# Patient Record
Sex: FEMALE | Race: WHITE | URBAN - METROPOLITAN AREA
[De-identification: names, ages, dates, MRNs, and addresses within clinical notes are randomized per-mention and may not be internally consistent; named-entity substitution may affect disease eponyms.]

---

## 2019-01-03 ENCOUNTER — FOLLOW UP ESTABLISHED (OUTPATIENT)
Dept: URBAN - METROPOLITAN AREA CLINIC 56 | Facility: CLINIC | Age: 39
End: 2019-01-03
Payer: COMMERCIAL

## 2019-01-03 ENCOUNTER — APPOINTMENT (RX ONLY)
Dept: URBAN - METROPOLITAN AREA CLINIC 163 | Facility: CLINIC | Age: 39
Setting detail: DERMATOLOGY
End: 2019-01-03

## 2019-01-03 DIAGNOSIS — L51.1 STEVENS-JOHNSON SYNDROME: ICD-10-CM

## 2019-01-03 PROCEDURE — 92285 EXTERNAL OCULAR PHOTOGRAPHY: CPT | Performed by: OPHTHALMOLOGY

## 2019-01-03 PROCEDURE — ? PATIENT SPECIFIC COUNSELING

## 2019-01-03 PROCEDURE — ? COUNSELING

## 2019-01-03 PROCEDURE — 99202 OFFICE O/P NEW SF 15 MIN: CPT

## 2019-01-03 PROCEDURE — 99203 OFFICE O/P NEW LOW 30 MIN: CPT | Performed by: OPHTHALMOLOGY

## 2019-01-03 PROCEDURE — ? PRESCRIPTION

## 2019-01-03 PROCEDURE — ? EDUCATIONAL RESOURCES PROVIDED

## 2019-01-03 RX ORDER — TRIAMCINOLONE ACETONIDE 1 MG/G
1 PASTE TOPICAL TWICE DAILY
Qty: 1 | Refills: 2 | Status: ERX | COMMUNITY
Start: 2019-01-03

## 2019-01-03 RX ORDER — TRIAMCINOLONE ACETONIDE 1 MG/G
APPLY PASTE TOPICAL TWICE DAILY
Qty: 1 | Refills: 2 | Status: ERX

## 2019-01-03 RX ORDER — PREDNISOLONE ACETATE 10 MG/ML
1 % SUSPENSION/ DROPS OPHTHALMIC
Qty: 1 | Refills: 1 | Status: INACTIVE
Start: 2019-01-03 | End: 2019-02-15

## 2019-01-03 RX ADMIN — TRIAMCINOLONE ACETONIDE 1: 1 PASTE TOPICAL at 00:00

## 2019-01-03 ASSESSMENT — LOCATION SIMPLE DESCRIPTION DERM
LOCATION SIMPLE: VULVA
LOCATION SIMPLE: LEFT INFERIOR LIP
LOCATION SIMPLE: RIGHT INFERIOR MUCOSAL LIP

## 2019-01-03 ASSESSMENT — LOCATION DETAILED DESCRIPTION DERM
LOCATION DETAILED: LEFT LABIA MAJORA
LOCATION DETAILED: LEFT INFERIOR VERMILION LIP
LOCATION DETAILED: RIGHT LABIA MAJORA
LOCATION DETAILED: RIGHT INFERIOR MUCOSAL LIP

## 2019-01-03 ASSESSMENT — SEVERITY ASSESSMENT: SEVERITY: MILD

## 2019-01-03 ASSESSMENT — LOCATION ZONE DERM
LOCATION ZONE: LIP
LOCATION ZONE: VULVA

## 2019-01-03 NOTE — PROCEDURE: PATIENT SPECIFIC COUNSELING
Patient will use warm water swish with small amount of salt and tumeric 2-3 times daily as tolerated.  \\n\\nShe will use triamcinolone dental paste for mucosal lip and inside the mouth until healed.  Vaniply ointment to use throughout the day on the lip.  \\n\\nVaginal area appears almost healed.
Detail Level: Zone
Patient should update medical record to reflect severe allergy to Amoxicillin and Flu shot.  Patient should also avoid cephalosporins due to possible cross reaction.

## 2019-01-03 NOTE — HPI: RASH
How Severe Is Your Rash?: moderate
Is This A New Presentation, Or A Follow-Up?: Rash
Additional History: Patient started having flu like symptoms 2 weeks ago and started taking amoxicillin thinking it would help her symptoms.

## 2019-01-11 ENCOUNTER — FOLLOW UP ESTABLISHED (OUTPATIENT)
Dept: URBAN - METROPOLITAN AREA CLINIC 24 | Facility: CLINIC | Age: 39
End: 2019-01-11
Payer: COMMERCIAL

## 2019-01-11 PROCEDURE — 92012 INTRM OPH EXAM EST PATIENT: CPT | Performed by: OPHTHALMOLOGY

## 2019-01-11 ASSESSMENT — INTRAOCULAR PRESSURE
OD: 13
OS: 12

## 2019-01-25 ENCOUNTER — FOLLOW UP ESTABLISHED (OUTPATIENT)
Dept: URBAN - METROPOLITAN AREA CLINIC 10 | Facility: CLINIC | Age: 39
End: 2019-01-25
Payer: COMMERCIAL

## 2019-01-25 PROCEDURE — 92012 INTRM OPH EXAM EST PATIENT: CPT | Performed by: OPHTHALMOLOGY

## 2019-01-25 PROCEDURE — 65778 COVER EYE W/MEMBRANE: CPT | Performed by: OPHTHALMOLOGY

## 2019-01-25 RX ORDER — OFLOXACIN 3 MG/ML
0.3 % SOLUTION/ DROPS OPHTHALMIC
Qty: 1 | Refills: 0 | Status: INACTIVE
Start: 2019-01-25 | End: 2019-05-08

## 2019-01-31 ENCOUNTER — FOLLOW UP ESTABLISHED (OUTPATIENT)
Dept: URBAN - METROPOLITAN AREA CLINIC 24 | Facility: CLINIC | Age: 39
End: 2019-01-31
Payer: COMMERCIAL

## 2019-01-31 PROCEDURE — 99024 POSTOP FOLLOW-UP VISIT: CPT | Performed by: OPHTHALMOLOGY

## 2019-02-08 ENCOUNTER — FOLLOW UP ESTABLISHED (OUTPATIENT)
Dept: URBAN - METROPOLITAN AREA CLINIC 24 | Facility: CLINIC | Age: 39
End: 2019-02-08
Payer: COMMERCIAL

## 2019-02-08 PROCEDURE — 92012 INTRM OPH EXAM EST PATIENT: CPT | Performed by: OPHTHALMOLOGY

## 2019-02-08 PROCEDURE — 65778 COVER EYE W/MEMBRANE: CPT | Performed by: OPHTHALMOLOGY

## 2019-02-08 RX ORDER — CYCLOSPORINE 0.5 MG/ML
0.05 % EMULSION OPHTHALMIC
Qty: 180 | Refills: 3 | Status: INACTIVE
Start: 2019-02-08 | End: 2019-02-12

## 2019-02-12 ENCOUNTER — FOLLOW UP ESTABLISHED (OUTPATIENT)
Dept: URBAN - METROPOLITAN AREA CLINIC 30 | Facility: CLINIC | Age: 39
End: 2019-02-12
Payer: COMMERCIAL

## 2019-02-12 PROCEDURE — 99213 OFFICE O/P EST LOW 20 MIN: CPT | Performed by: OPTOMETRIST

## 2019-02-15 ENCOUNTER — FOLLOW UP ESTABLISHED (OUTPATIENT)
Dept: URBAN - METROPOLITAN AREA CLINIC 24 | Facility: CLINIC | Age: 39
End: 2019-02-15
Payer: COMMERCIAL

## 2019-02-15 PROCEDURE — 92012 INTRM OPH EXAM EST PATIENT: CPT | Performed by: OPTOMETRIST

## 2019-02-15 RX ORDER — PREDNISOLONE ACETATE 10 MG/ML
1 % SUSPENSION/ DROPS OPHTHALMIC
Qty: 1 | Refills: 1 | Status: INACTIVE
Start: 2019-02-15 | End: 2019-05-08

## 2019-02-27 ENCOUNTER — APPOINTMENT (RX ONLY)
Dept: URBAN - METROPOLITAN AREA CLINIC 172 | Facility: CLINIC | Age: 39
Setting detail: DERMATOLOGY
End: 2019-02-27

## 2019-02-27 DIAGNOSIS — L51.1 STEVENS-JOHNSON SYNDROME: ICD-10-CM | Status: RESOLVED

## 2019-02-27 DIAGNOSIS — M35 OTHER SYSTEMIC INVOLVEMENT OF CONNECTIVE TISSUE: ICD-10-CM

## 2019-02-27 PROBLEM — M35.01 SJÖGREN SYNDROME WITH KERATOCONJUNCTIVITIS: Status: ACTIVE | Noted: 2019-02-27

## 2019-02-27 PROCEDURE — ? PATIENT SPECIFIC COUNSELING

## 2019-02-27 PROCEDURE — 99212 OFFICE O/P EST SF 10 MIN: CPT

## 2019-02-27 PROCEDURE — ? REFERRAL

## 2019-02-27 ASSESSMENT — LOCATION DETAILED DESCRIPTION DERM
LOCATION DETAILED: RIGHT INFERIOR LID MARGIN
LOCATION DETAILED: LEFT LATERAL SCLERA
LOCATION DETAILED: RIGHT INFERIOR MEDIAL MALAR CHEEK
LOCATION DETAILED: RIGHT LATERAL SCLERA

## 2019-02-27 ASSESSMENT — LOCATION ZONE DERM
LOCATION ZONE: CONJUNCTIVA
LOCATION ZONE: FACE
LOCATION ZONE: EYELID

## 2019-02-27 ASSESSMENT — LOCATION SIMPLE DESCRIPTION DERM
LOCATION SIMPLE: LEFT EYE
LOCATION SIMPLE: RIGHT CHEEK
LOCATION SIMPLE: RIGHT EYE
LOCATION SIMPLE: RIGHT INFERIOR EYELID

## 2019-02-27 NOTE — PROCEDURE: PATIENT SPECIFIC COUNSELING
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 She is followed by retina/cornea specialist. Patient believes augmentin VS flu vaccine could have triggered this. Patient to keep her upcoming visit with cornea specialist tomorrow. Patient was given Dr. Winter cell number to provide to her corneal specialist.
Detail Level: Zone
The patient has a history of Sjogren's syndrome but has not had rheumatology followup in years. The patient was advised that she requires rheumatology followup. She was instructed to contact Dr. Sushil Jackson and schedule an appointment.

## 2019-03-08 ENCOUNTER — FOLLOW UP ESTABLISHED (OUTPATIENT)
Dept: URBAN - METROPOLITAN AREA CLINIC 24 | Facility: CLINIC | Age: 39
End: 2019-03-08
Payer: COMMERCIAL

## 2019-03-08 PROCEDURE — 68761 CLOSE TEAR DUCT OPENING: CPT | Performed by: OPHTHALMOLOGY

## 2019-03-08 PROCEDURE — 92012 INTRM OPH EXAM EST PATIENT: CPT | Performed by: OPHTHALMOLOGY

## 2019-04-10 ENCOUNTER — FOLLOW UP ESTABLISHED (OUTPATIENT)
Dept: URBAN - METROPOLITAN AREA CLINIC 30 | Facility: CLINIC | Age: 39
End: 2019-04-10
Payer: COMMERCIAL

## 2019-04-10 DIAGNOSIS — H02.052 TRICHIASIS WITHOUT ENTROPION, RIGHT LOWER LID: ICD-10-CM

## 2019-04-10 DIAGNOSIS — H11.233 SYMBLEPHARON, BILATERAL: ICD-10-CM

## 2019-04-10 PROCEDURE — 92012 INTRM OPH EXAM EST PATIENT: CPT | Performed by: OPHTHALMOLOGY

## 2019-04-10 PROCEDURE — 67820 REVISE EYELASHES: CPT | Performed by: OPHTHALMOLOGY

## 2019-04-10 PROCEDURE — 68761 CLOSE TEAR DUCT OPENING: CPT | Performed by: OPHTHALMOLOGY

## 2019-04-10 ASSESSMENT — INTRAOCULAR PRESSURE
OD: 14
OS: 14

## 2019-04-18 ENCOUNTER — FOLLOW UP ESTABLISHED (OUTPATIENT)
Dept: URBAN - METROPOLITAN AREA CLINIC 44 | Facility: CLINIC | Age: 39
End: 2019-04-18
Payer: COMMERCIAL

## 2019-04-18 DIAGNOSIS — H02.054 TRICHIASIS WITHOUT ENTROPION, LEFT UPPER LID: ICD-10-CM

## 2019-04-18 DIAGNOSIS — H02.055 TRICHIASIS WITHOUT ENTROPION, LEFT LOWER LID: ICD-10-CM

## 2019-04-18 PROCEDURE — 92285 EXTERNAL OCULAR PHOTOGRAPHY: CPT | Performed by: OPHTHALMOLOGY

## 2019-04-18 PROCEDURE — 99213 OFFICE O/P EST LOW 20 MIN: CPT | Performed by: OPHTHALMOLOGY

## 2019-05-08 ENCOUNTER — FOLLOW UP ESTABLISHED (OUTPATIENT)
Dept: URBAN - METROPOLITAN AREA CLINIC 30 | Facility: CLINIC | Age: 39
End: 2019-05-08
Payer: COMMERCIAL

## 2019-05-08 DIAGNOSIS — L51.1 STEVENS-JOHNSON SYNDROME: ICD-10-CM

## 2019-05-08 PROCEDURE — 92012 INTRM OPH EXAM EST PATIENT: CPT | Performed by: OPHTHALMOLOGY

## 2019-05-08 RX ORDER — PREDNISOLONE ACETATE 10 MG/ML
1 % SUSPENSION/ DROPS OPHTHALMIC
Qty: 1 | Refills: 1 | Status: INACTIVE
Start: 2019-05-08 | End: 2019-06-19

## 2019-05-08 RX ORDER — CYCLOSPORINE 0.5 MG/ML
0.05 % EMULSION OPHTHALMIC
Qty: 180 | Refills: 3 | Status: INACTIVE
Start: 2019-05-08 | End: 2020-05-01

## 2019-05-08 ASSESSMENT — INTRAOCULAR PRESSURE
OS: 18
OD: 18

## 2019-06-19 ENCOUNTER — FOLLOW UP ESTABLISHED (OUTPATIENT)
Dept: URBAN - METROPOLITAN AREA CLINIC 30 | Facility: CLINIC | Age: 39
End: 2019-06-19
Payer: COMMERCIAL

## 2019-06-19 PROCEDURE — 92012 INTRM OPH EXAM EST PATIENT: CPT | Performed by: OPHTHALMOLOGY

## 2019-06-19 RX ORDER — LOTEPREDNOL ETABONATE 5 MG/ML
0.5 % SUSPENSION/ DROPS OPHTHALMIC
Qty: 1 | Refills: 1 | Status: INACTIVE
Start: 2019-06-19 | End: 2019-08-20

## 2019-06-19 RX ORDER — LOTEPREDNOL ETABONATE 5 MG/ML
0.5 % SUSPENSION/ DROPS OPHTHALMIC
Qty: 0 | Refills: 0 | Status: INACTIVE
Start: 2019-06-19 | End: 2019-06-19

## 2019-06-19 ASSESSMENT — INTRAOCULAR PRESSURE
OS: 11
OD: 11

## 2019-07-23 ENCOUNTER — FOLLOW UP ESTABLISHED (OUTPATIENT)
Dept: URBAN - METROPOLITAN AREA CLINIC 30 | Facility: CLINIC | Age: 39
End: 2019-07-23
Payer: COMMERCIAL

## 2019-07-23 DIAGNOSIS — H01.112 ALLERGIC DERMATITIS OF RIGHT LOWER LID: Primary | ICD-10-CM

## 2019-07-23 PROCEDURE — 68761 CLOSE TEAR DUCT OPENING: CPT | Performed by: OPTOMETRIST

## 2019-07-31 ENCOUNTER — FOLLOW UP ESTABLISHED (OUTPATIENT)
Dept: URBAN - METROPOLITAN AREA CLINIC 30 | Facility: CLINIC | Age: 39
End: 2019-07-31
Payer: COMMERCIAL

## 2019-07-31 DIAGNOSIS — H01.115 ALLERGIC DERMATITIS OF LEFT LOWER EYELID: ICD-10-CM

## 2019-07-31 PROCEDURE — 92012 INTRM OPH EXAM EST PATIENT: CPT | Performed by: OPHTHALMOLOGY

## 2019-07-31 RX ORDER — METHYLPREDNISOLONE 4 MG/1
4 MG TABLET ORAL TAKE AS DIRECTED
Qty: 1 | Refills: 0 | Status: INACTIVE
Start: 2019-07-31 | End: 2019-08-20

## 2019-07-31 ASSESSMENT — INTRAOCULAR PRESSURE
OS: 11
OD: 13

## 2019-08-20 ENCOUNTER — FOLLOW UP ESTABLISHED (OUTPATIENT)
Dept: URBAN - METROPOLITAN AREA CLINIC 30 | Facility: CLINIC | Age: 39
End: 2019-08-20
Payer: COMMERCIAL

## 2019-08-20 PROCEDURE — 68810 PROBE NASOLACRIMAL DUCT: CPT | Performed by: OPTOMETRIST

## 2019-08-20 PROCEDURE — 99213 OFFICE O/P EST LOW 20 MIN: CPT | Performed by: OPTOMETRIST

## 2019-08-20 PROCEDURE — 68761 CLOSE TEAR DUCT OPENING: CPT | Performed by: OPTOMETRIST

## 2019-08-29 ENCOUNTER — FOLLOW UP ESTABLISHED (OUTPATIENT)
Dept: URBAN - METROPOLITAN AREA CLINIC 44 | Facility: CLINIC | Age: 39
End: 2019-08-29
Payer: COMMERCIAL

## 2019-08-29 PROCEDURE — 92285 EXTERNAL OCULAR PHOTOGRAPHY: CPT | Performed by: OPHTHALMOLOGY

## 2019-08-29 PROCEDURE — 99213 OFFICE O/P EST LOW 20 MIN: CPT | Performed by: OPHTHALMOLOGY

## 2019-10-01 ENCOUNTER — FOLLOW UP ESTABLISHED (OUTPATIENT)
Dept: URBAN - METROPOLITAN AREA CLINIC 30 | Facility: CLINIC | Age: 39
End: 2019-10-01
Payer: COMMERCIAL

## 2019-10-01 PROCEDURE — 83861 MICROFLUID ANALY TEARS: CPT | Performed by: OPTOMETRIST

## 2019-10-01 PROCEDURE — 68761 CLOSE TEAR DUCT OPENING: CPT | Performed by: OPTOMETRIST

## 2019-10-01 ASSESSMENT — INTRAOCULAR PRESSURE
OD: 13
OS: 12

## 2019-10-14 ENCOUNTER — FOLLOW UP ESTABLISHED (OUTPATIENT)
Dept: URBAN - METROPOLITAN AREA CLINIC 30 | Facility: CLINIC | Age: 39
End: 2019-10-14
Payer: COMMERCIAL

## 2019-10-14 DIAGNOSIS — H10.812 PINGUECULITIS, LEFT EYE: ICD-10-CM

## 2019-10-14 PROCEDURE — 92012 INTRM OPH EXAM EST PATIENT: CPT | Performed by: OPTOMETRIST

## 2019-10-14 RX ORDER — FLUOROMETHOLONE 2.5 MG/ML
0.25 % SUSPENSION/ DROPS OPHTHALMIC
Qty: 1 | Refills: 0 | Status: INACTIVE
Start: 2019-10-14 | End: 2019-11-12

## 2019-11-12 ENCOUNTER — FOLLOW UP ESTABLISHED (OUTPATIENT)
Dept: URBAN - METROPOLITAN AREA CLINIC 30 | Facility: CLINIC | Age: 39
End: 2019-11-12
Payer: COMMERCIAL

## 2019-11-12 PROCEDURE — 68761 CLOSE TEAR DUCT OPENING: CPT | Performed by: OPTOMETRIST

## 2019-11-12 ASSESSMENT — INTRAOCULAR PRESSURE
OS: 11
OD: 13

## 2020-01-29 ENCOUNTER — FOLLOW UP ESTABLISHED (OUTPATIENT)
Dept: URBAN - METROPOLITAN AREA CLINIC 30 | Facility: CLINIC | Age: 40
End: 2020-01-29
Payer: COMMERCIAL

## 2020-01-29 PROCEDURE — 92012 INTRM OPH EXAM EST PATIENT: CPT | Performed by: OPHTHALMOLOGY

## 2020-05-01 ENCOUNTER — FOLLOW UP ESTABLISHED (OUTPATIENT)
Dept: URBAN - METROPOLITAN AREA CLINIC 30 | Facility: CLINIC | Age: 40
End: 2020-05-01
Payer: COMMERCIAL

## 2020-05-01 PROCEDURE — 99213 OFFICE O/P EST LOW 20 MIN: CPT | Performed by: OPTOMETRIST

## 2020-05-01 RX ORDER — PREDNISOLONE ACETATE 10 MG/ML
1 % SUSPENSION/ DROPS OPHTHALMIC
Qty: 1 | Refills: 1 | Status: INACTIVE
Start: 2020-05-01 | End: 2021-08-06

## 2020-11-05 ENCOUNTER — FOLLOW UP ESTABLISHED (OUTPATIENT)
Dept: URBAN - METROPOLITAN AREA CLINIC 30 | Facility: CLINIC | Age: 40
End: 2020-11-05
Payer: COMMERCIAL

## 2020-11-05 PROCEDURE — 68761 CLOSE TEAR DUCT OPENING: CPT | Performed by: OPTOMETRIST

## 2020-11-05 ASSESSMENT — INTRAOCULAR PRESSURE
OS: 11
OD: 11

## 2020-11-13 ENCOUNTER — FOLLOW UP ESTABLISHED (OUTPATIENT)
Dept: URBAN - METROPOLITAN AREA CLINIC 24 | Facility: CLINIC | Age: 40
End: 2020-11-13
Payer: COMMERCIAL

## 2020-11-13 PROCEDURE — 92012 INTRM OPH EXAM EST PATIENT: CPT | Performed by: OPHTHALMOLOGY

## 2020-11-13 RX ORDER — CYCLOSPORINE 0.5 MG/ML
0.05 % EMULSION OPHTHALMIC
Qty: 180 | Refills: 3 | Status: INACTIVE
Start: 2020-11-13 | End: 2021-08-06

## 2020-11-13 ASSESSMENT — INTRAOCULAR PRESSURE
OS: 8
OD: 9

## 2021-08-06 ENCOUNTER — OFFICE VISIT (OUTPATIENT)
Dept: URBAN - METROPOLITAN AREA CLINIC 30 | Facility: CLINIC | Age: 41
End: 2021-08-06
Payer: COMMERCIAL

## 2021-08-06 PROCEDURE — 68761 CLOSE TEAR DUCT OPENING: CPT | Performed by: OPTOMETRIST

## 2021-08-06 RX ORDER — CYCLOSPORINE 0.5 MG/ML
0.05 % EMULSION OPHTHALMIC
Qty: 180 | Refills: 3 | Status: INACTIVE
Start: 2021-08-06 | End: 2022-02-11

## 2021-08-06 ASSESSMENT — INTRAOCULAR PRESSURE
OS: 15
OD: 16

## 2021-08-06 NOTE — IMPRESSION/PLAN
Impression: Sicca syndrome with keratoconjunctivitis OU, severe - h/o Sjogren's dz and SJS (dx 12/2018) - No improvement with frequent lubrication and Xiidra. 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 Not using Restasis BID OU- will resume Cont PFAT's every hour. Not using sleep mask. 10/2019 0.5 mm ultraplug RUL replaced with Large Odyssey , plugs remain in place RLL, LLL, DESIREE
11/2020: Plugs are in place BUL. Plug missing today LLL. Replaced in office today LLL c 0.6mm Ultra plug. 
8/2021; RLL plug missing today--replaced

## 2022-01-28 ENCOUNTER — OFFICE VISIT (OUTPATIENT)
Dept: URBAN - METROPOLITAN AREA CLINIC 17 | Facility: CLINIC | Age: 42
End: 2022-01-28
Payer: COMMERCIAL

## 2022-01-28 DIAGNOSIS — H52.223 REGULAR ASTIGMATISM, BILATERAL: Primary | ICD-10-CM

## 2022-01-28 PROCEDURE — 92002 INTRM OPH EXAM NEW PATIENT: CPT | Performed by: OPTOMETRIST

## 2022-01-28 ASSESSMENT — INTRAOCULAR PRESSURE
OS: 11
OD: 11

## 2022-01-28 ASSESSMENT — VISUAL ACUITY
OD: 20/25
OS: 20/20

## 2022-02-11 ENCOUNTER — OFFICE VISIT (OUTPATIENT)
Dept: URBAN - METROPOLITAN AREA CLINIC 30 | Facility: CLINIC | Age: 42
End: 2022-02-11
Payer: COMMERCIAL

## 2022-02-11 DIAGNOSIS — H25.013 CORTICAL AGE-RELATED CATARACT, BILATERAL: ICD-10-CM

## 2022-02-11 DIAGNOSIS — H43.393 OTHER VITREOUS OPACITIES, BILATERAL: ICD-10-CM

## 2022-02-11 DIAGNOSIS — M35.01 SICCA SYNDROME WITH KERATOCONJUNCTIVITIS: Primary | ICD-10-CM

## 2022-02-11 PROCEDURE — 92134 CPTRZ OPH DX IMG PST SGM RTA: CPT | Performed by: OPTOMETRIST

## 2022-02-11 PROCEDURE — 99214 OFFICE O/P EST MOD 30 MIN: CPT | Performed by: OPTOMETRIST

## 2022-02-11 RX ORDER — CYCLOSPORINE 0.5 MG/ML
0.05 % EMULSION OPHTHALMIC
Qty: 180 | Refills: 3 | Status: INACTIVE
Start: 2022-02-11 | End: 2022-03-09

## 2022-02-11 RX ORDER — PREDNISOLONE ACETATE 10 MG/ML
1 % SUSPENSION/ DROPS OPHTHALMIC
Qty: 5 | Refills: 0 | Status: ACTIVE
Start: 2022-02-11

## 2022-02-11 ASSESSMENT — KERATOMETRY
OD: 42.41
OS: 41.41

## 2022-02-11 ASSESSMENT — VISUAL ACUITY
OS: 20/30
OD: 20/30

## 2022-02-11 ASSESSMENT — INTRAOCULAR PRESSURE
OS: 11
OD: 12

## 2022-02-11 NOTE — IMPRESSION/PLAN
Impression: Sicca syndrome with keratoconjunctivitis OU, severe - h/o Sjogren's dz and SJS (dx 12/2018) - No improvement with frequent lubrication and Xiidra. - temporary improvement with ambiodisc Plan: Symptoms persist OU. FBS  likley due to symblepharon/lid edema. 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 Never received Restasis BID OU- will resend. Restart PA 1% BID OU-pt ed on SEs. BLL and BUL in place today. Discussed BCL's on a regular basis (daily disposable). Discussed R/B/A. Cont PFAT's every hour. Not using sleep mask. 10/2019 0.5 mm ultraplug RUL replaced with Large Odyssey 11/2020: Plugs are in place BUL. LLL c 0.6mm Ultra plug.

## 2022-07-06 ENCOUNTER — OFFICE VISIT (OUTPATIENT)
Dept: URBAN - METROPOLITAN AREA CLINIC 30 | Facility: CLINIC | Age: 42
End: 2022-07-06
Payer: COMMERCIAL

## 2022-07-06 DIAGNOSIS — M35.01 SICCA SYNDROME WITH KERATOCONJUNCTIVITIS: Primary | ICD-10-CM

## 2022-07-06 PROCEDURE — 99213 OFFICE O/P EST LOW 20 MIN: CPT | Performed by: STUDENT IN AN ORGANIZED HEALTH CARE EDUCATION/TRAINING PROGRAM

## 2022-07-06 ASSESSMENT — INTRAOCULAR PRESSURE
OD: 13
OS: 14

## 2022-07-06 NOTE — IMPRESSION/PLAN
Impression: Sicca syndrome with keratoconjunctivitis OU, severe - h/o Sjogren's dz and SJS (dx 12/2018) - No improvement with frequent lubrication and Xiidra. - temporary improvement with ambiodisc Plan: Symptoms persist OU. FBS  likely due to symblepharon/lid edema. 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 Cont Restasis BID OU & PA 1% BID OU New punctal plug place RLL Cont PFAT's every hour. Not using sleep mask. 10/2019 0.5 mm ultraplug RUL replaced with Large Odyssey 11/2020: Plugs are in place BUL. LLL c 0.6mm Ultra plug. 7/2022: 0.6mm plug replaced RLL; LOT [id number] RTC n/a for f/u in dry eye clinic

## 2022-08-11 ENCOUNTER — OFFICE VISIT (OUTPATIENT)
Dept: URBAN - METROPOLITAN AREA CLINIC 30 | Facility: CLINIC | Age: 42
End: 2022-08-11
Payer: COMMERCIAL

## 2022-08-11 DIAGNOSIS — H53.8 OTHER VISUAL DISTURBANCES: Primary | ICD-10-CM

## 2022-08-11 DIAGNOSIS — M35.01 SICCA SYNDROME WITH KERATOCONJUNCTIVITIS: ICD-10-CM

## 2022-08-11 PROCEDURE — 92083 EXTENDED VISUAL FIELD XM: CPT | Performed by: OPTOMETRIST

## 2022-08-11 PROCEDURE — 99213 OFFICE O/P EST LOW 20 MIN: CPT | Performed by: OPTOMETRIST

## 2022-08-11 ASSESSMENT — INTRAOCULAR PRESSURE
OS: 14
OD: 13

## 2022-08-11 NOTE — IMPRESSION/PLAN
Impression: Sicca syndrome with keratoconjunctivitis OU, severe - h/o Sjogren's dz and SJS (dx 12/2018) - No improvement with frequent lubrication and Xiidra. - temporary improvement with ambiodisc Plan: Symptoms persist OS>OD. Again, OS blurry. Pt still notes FBS-- likely due to symblepharon/lid edema. 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 Continue Restasis BID OU & PA 1% BID OU Cont PFAT's every hour. Not using sleep mask. 10/2019  RUL  Large Odyssey 11/2020: Plugs are in place BUL. LLL c 0.6mm Ultra plug. 7/2022: 0.6mm plug replaced RLL- missing today.

## 2022-08-11 NOTE — IMPRESSION/PLAN
Impression: Other visual disturbances: H53.8. Plan: Acute, decreased VA OS since the end of July. Questionable etiology. Possibly mild metamorphopsia to AG. Mac OCT WNL OU. ONH appear WNL OU. BG was normal on most recent lab-work. Other blood work thus far unrevealing. Recommend  MRI of the brain and orbits, w + w/o contrast to r/o other potential etiologies. Repeat   and RNFL at next visit- paracentral defects OU. AMANDA -APD. Need to also r/o non organic LOV if MRI is WNL OU.  

08/2022  OS) low reliability, high FN OU) sup paracentral defects 08/2022 RNFL OU) WNL, low relibaility 08/2022 GCL OD) 42 OS) 77. low reliability. 08/2022  test OU: 10/10.   WNL

## 2022-12-30 ENCOUNTER — OFFICE VISIT (OUTPATIENT)
Dept: URBAN - METROPOLITAN AREA CLINIC 30 | Facility: CLINIC | Age: 42
End: 2022-12-30
Payer: COMMERCIAL

## 2022-12-30 DIAGNOSIS — M35.01 SICCA SYNDROME WITH KERATOCONJUNCTIVITIS: Primary | ICD-10-CM

## 2022-12-30 DIAGNOSIS — H53.8 OTHER VISUAL DISTURBANCES: ICD-10-CM

## 2022-12-30 PROCEDURE — 92083 EXTENDED VISUAL FIELD XM: CPT | Performed by: OPTOMETRIST

## 2022-12-30 PROCEDURE — 92071 CONTACT LENS FITTING FOR TX: CPT | Performed by: OPTOMETRIST

## 2022-12-30 PROCEDURE — 92133 CPTRZD OPH DX IMG PST SGM ON: CPT | Performed by: OPTOMETRIST

## 2022-12-30 PROCEDURE — 99214 OFFICE O/P EST MOD 30 MIN: CPT | Performed by: OPTOMETRIST

## 2022-12-30 RX ORDER — OFLOXACIN 3 MG/ML
0.3 % SOLUTION/ DROPS OPHTHALMIC
Qty: 5 | Refills: 0 | Status: ACTIVE
Start: 2022-12-30

## 2022-12-30 RX ORDER — PREDNISOLONE ACETATE 10 MG/ML
1 % SUSPENSION/ DROPS OPHTHALMIC
Qty: 5 | Refills: 0 | Status: ACTIVE
Start: 2022-12-30

## 2022-12-30 ASSESSMENT — VISUAL ACUITY
OD: 20/40
OS: 20/50

## 2022-12-30 ASSESSMENT — INTRAOCULAR PRESSURE
OD: 13
OS: 14

## 2022-12-30 NOTE — IMPRESSION/PLAN
Impression: Other visual disturbances: H53.8. Plan: Acute, decreased VA OS since the end of July. Questionable etiology. Possibly mild metamorphopsia to AG. Mac OCT WNL OU. ONH appear WNL OU. BG was normal on most recent lab-work. Other blood work thus far unrevealing. AMANDA -APD. **12/6/2022 MRI WNL-consider non-organic LOV**

08/2022  OS) low reliability, high FN OU) sup paracentral defects 12/2022 VF 24-2 OU) grossly WNL 

08/2022 RNFL OU) WNL, low relibaility GCL OD) 42 OS) 77. low reliability. 12/2022 RNFL OU) WNL GCA: OU) WNL

08/2022  test OU: 10/10.   WNL

## 2022-12-30 NOTE — IMPRESSION/PLAN
Impression: Sicca syndrome with keratoconjunctivitis OU, severe - h/o Sjogren's dz and SJS (dx 12/2018) - No improvement with frequent lubrication and Xiidra. - temporary improvement with ambiodisc Plan: PEK persists OS>OD today. Again, OS blurry. Pt still notes FBS-- likely due to symblepharon/lid edema. 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 Continue Restasis BID OU. Renewed PA 1% BID OU Cont Systane Ultra PFAT's every hour- pt notes feeling like they dry fast. Not using sleep mask. Placed BCL OS NURIA 8.4. Start Ofloxacin TID OS. 


11/2020: Plugs are in place BUL. LLL c 0.6mm Ultra plug. 
7/2022: 0.6mm plug replaced RLL

## 2023-01-13 ENCOUNTER — OFFICE VISIT (OUTPATIENT)
Dept: URBAN - METROPOLITAN AREA CLINIC 30 | Facility: CLINIC | Age: 43
End: 2023-01-13
Payer: COMMERCIAL

## 2023-01-13 DIAGNOSIS — M35.01 SICCA SYNDROME WITH KERATOCONJUNCTIVITIS: Primary | ICD-10-CM

## 2023-01-13 PROCEDURE — 83861 MICROFLUID ANALY TEARS: CPT | Performed by: OPTOMETRIST

## 2023-01-13 ASSESSMENT — KERATOMETRY
OS: 42.25
OD: 42.50

## 2023-01-13 ASSESSMENT — VISUAL ACUITY
OD: 20/30
OS: 20/40

## 2023-01-13 NOTE — IMPRESSION/PLAN
Impression: Sicca syndrome with keratoconjunctivitis OU, severe 1/2023 OSMO 333, Unable - h/o Sjogren's dz and SJS (dx 12/2018) - No improvement with frequent lubrication and Xiidra. - temporary improvement with ambiodisc Plan: PEK resolved OU today. Again, OS blurry. Pt notes tearing OD>OS- consider removing UL plugs if epiphoria persists. Pt still notes FBS-- likely due to symblepharon/lid edema. 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 Continue Restasis BID OU. Finish PA 1% BID OU. Cont Systane Ultra PFAT's every hour- pt notes feeling like they dry fast. Not using sleep mask. 1/13/20223 Removed BCL OS NURIA 8.4. D/C Ofloxacin TID OS. Discussed daily disposable BCLs x 3 months to help maintain ocular surface. 11/2020:  LLL c 0.6mm Ultra plug. 7/2022: 0.6mm plug replaced RLL.
1/2023 replaced RLL c Large BVI and DESIREE c 0.5mm UltraPlug. Consent form signed.

## 2023-03-23 ENCOUNTER — OFFICE VISIT (OUTPATIENT)
Dept: URBAN - METROPOLITAN AREA CLINIC 30 | Facility: CLINIC | Age: 43
End: 2023-03-23
Payer: COMMERCIAL

## 2023-03-23 DIAGNOSIS — M35.01 SICCA SYNDROME WITH KERATOCONJUNCTIVITIS: Primary | ICD-10-CM

## 2023-03-23 PROCEDURE — 83861 MICROFLUID ANALY TEARS: CPT | Performed by: OPTOMETRIST

## 2023-03-23 NOTE — IMPRESSION/PLAN
Impression: Sicca syndrome with keratoconjunctivitis OU, severe 3/2023 OSMO 307, 323. 
- h/o Sjogren's dz and SJS (dx 12/2018) - No improvement with frequent lubrication and Xiidra. - temporary improvement with ambiodisc Plan: PEK resolved OU today. Again, OS blurry. Pt notes tearing OD>OS- consider removing UL plugs if epiphoria persists. Pt still notes FBS-- likely due to symblepharon/lid edema. Symblepharon- Dr Cerda Sensing advised against lysis at this point unless pt is more symptomatic. Consider UL cauterization if plugs continue to displace. PLAN: Continue Restasis BID OU. Cont Systane Ultra PFAT's every hour- pt notes feeling like they dry fast. Not using sleep mask. 1/13/20223 Removed BCL OS NURIA 8.4. D/C Ofloxacin TID OS. Discussed daily disposable BCLs x 3 months to help maintain ocular surface. 1/2023 replaced RLL c Large BVI and DESIREE c 0.5mm UltraPlug. Consent form signed. 3/2023 replaced DESIREE c 0.6mm UltraPlug and LLL c 0.6mm UltraPlug. Consent form signed.

## 2023-06-23 ENCOUNTER — OFFICE VISIT (OUTPATIENT)
Dept: URBAN - METROPOLITAN AREA CLINIC 30 | Facility: CLINIC | Age: 43
End: 2023-06-23
Payer: COMMERCIAL

## 2023-06-23 DIAGNOSIS — H25.013 CORTICAL AGE-RELATED CATARACT, BILATERAL: Primary | ICD-10-CM

## 2023-06-23 DIAGNOSIS — H53.8 OTHER VISUAL DISTURBANCES: ICD-10-CM

## 2023-06-23 DIAGNOSIS — M35.01 SICCA SYNDROME WITH KERATOCONJUNCTIVITIS: ICD-10-CM

## 2023-06-23 DIAGNOSIS — H43.393 OTHER VITREOUS OPACITIES, BILATERAL: ICD-10-CM

## 2023-06-23 PROCEDURE — 99213 OFFICE O/P EST LOW 20 MIN: CPT | Performed by: OPTOMETRIST

## 2023-06-23 PROCEDURE — 83861 MICROFLUID ANALY TEARS: CPT | Performed by: OPTOMETRIST

## 2023-06-23 ASSESSMENT — VISUAL ACUITY
OS: 20/25
OD: 20/25

## 2023-06-23 ASSESSMENT — INTRAOCULAR PRESSURE
OD: 13
OS: 14

## 2023-06-23 ASSESSMENT — KERATOMETRY
OS: 41.09
OD: 42.67

## 2023-06-23 NOTE — IMPRESSION/PLAN
Impression: Sicca syndrome with keratoconjunctivitis OU, severe OSMO 320,330 6/2023 
- h/o Sjogren's dz and SJS (dx 12/2018) - No improvement with frequent lubrication and Xiidra. - temporary improvement with ambiodisc Plan: PEK persists OU. Again, OS blurry. Pt notes tearing OD>OS- consider removing UL plugs if epiphoria persists. Pt still notes FBS-- likely due to symblepharon/lid edema. Symblepharon- Dr Negro Amor advised against lysis at this point unless pt is more symptomatic. Consider UL cauterization if plugs continue to displace. PLAN: Continue Restasis BID OU. Cont Systane Ultra PFAT's every hour- pt notes feeling like they dry fast. Not using sleep mask. Pt trialed daily disposables x 1 day, but felt her eyes were more dry. Recommend re-trialing. 1/13/20223 Removed BCL OS NURIA 8.4. Discussed daily disposable BCLs x 3 months to help maintain ocular surface. 1/2023 replaced RLL c Large BVI and DESIREE c 0.5mm UltraPlug. Consent form signed. 3/2023 replaced DESIREE c 0.6mm UltraPlug and LLL c 0.6mm UltraPlug. Consent form signed.

## 2023-06-23 NOTE — IMPRESSION/PLAN
Impression: Cortical age-related cataract, bilateral: H25.013. Plan: Mild. Cataracts account for the patient's complaints. No treatment currently recommended. The patient will monitor vision changes and contact us with any decrease in vision. Monitor.

## 2023-06-23 NOTE — IMPRESSION/PLAN
Impression: Other visual disturbances: H53.8. Plan: Acute, decreased VA OS since the end of July. Questionable etiology. Possibly mild metamorphopsia to AG. Previous Mac OCT WNL OU. ONH appear WNL OU. BG was normal on most recent lab-work. Other blood work thus far unrevealing. AMANDA -APD. **12/6/2022 MRI WNL-consider non-organic LOV**

08/2022  OS) low reliability, high FN OU) sup paracentral defects 12/2022 VF 24-2 OU) grossly WNL 

08/2022 RNFL OU) WNL, low relibaility GCL OD) 42 OS) 77. low reliability. 12/2022 RNFL OU) WNL GCA: OU) WNL

08/2022  test OU: 10/10.   WNL

## 2023-06-23 NOTE — IMPRESSION/PLAN
Impression: Other vitreous opacities, bilateral: H43.393. Plan: Retina exam appears stable. Signs and symptoms of RD reviewed. RTC STAT if any increased in floaters or loss of VA. Monitor.

## 2024-10-03 ENCOUNTER — OFFICE VISIT (OUTPATIENT)
Dept: URBAN - METROPOLITAN AREA CLINIC 30 | Facility: CLINIC | Age: 44
End: 2024-10-03
Payer: COMMERCIAL

## 2024-10-03 DIAGNOSIS — H53.8 OTHER VISUAL DISTURBANCES: Primary | ICD-10-CM

## 2024-10-03 DIAGNOSIS — M35.01 SICCA SYNDROME WITH KERATOCONJUNCTIVITIS: ICD-10-CM

## 2024-10-03 PROCEDURE — 99214 OFFICE O/P EST MOD 30 MIN: CPT | Performed by: OPTOMETRIST

## 2024-10-03 RX ORDER — CYCLOSPORINE OPHTHALMIC SOLUTION 1 MG/ML
0.1 % SOLUTION/ DROPS OPHTHALMIC
Qty: 2 | Refills: 6 | Status: ACTIVE
Start: 2024-10-03

## 2024-10-03 RX ORDER — PREDNISOLONE ACETATE 10 MG/ML
1 % SUSPENSION/ DROPS OPHTHALMIC
Qty: 5 | Refills: 0 | Status: ACTIVE
Start: 2024-10-03

## 2024-10-03 RX ORDER — CYCLOSPORINE OPHTHALMIC SOLUTION 1 MG/ML
0.1 % SOLUTION/ DROPS OPHTHALMIC
Qty: 2 | Refills: 6 | Status: INACTIVE
Start: 2024-10-03 | End: 2024-10-03

## 2024-10-03 RX ORDER — PREDNISOLONE ACETATE 10 MG/ML
1 % SUSPENSION/ DROPS OPHTHALMIC
Qty: 5 | Refills: 0 | Status: INACTIVE
Start: 2024-10-03 | End: 2024-10-03

## 2024-10-03 ASSESSMENT — INTRAOCULAR PRESSURE
OS: 14
OD: 14

## 2025-01-03 ENCOUNTER — OFFICE VISIT (OUTPATIENT)
Dept: URBAN - METROPOLITAN AREA CLINIC 30 | Facility: CLINIC | Age: 45
End: 2025-01-03
Payer: COMMERCIAL

## 2025-01-03 DIAGNOSIS — M35.01 SICCA SYNDROME WITH KERATOCONJUNCTIVITIS: Primary | ICD-10-CM

## 2025-01-03 DIAGNOSIS — L30.9 DERMATITIS: ICD-10-CM

## 2025-01-03 PROCEDURE — 99214 OFFICE O/P EST MOD 30 MIN: CPT | Performed by: OPTOMETRIST

## 2025-01-03 PROCEDURE — 83861 MICROFLUID ANALY TEARS: CPT | Performed by: OPTOMETRIST

## 2025-01-03 RX ORDER — PREDNISOLONE ACETATE 10 MG/ML
1 % SUSPENSION/ DROPS OPHTHALMIC
Qty: 5 | Refills: 0 | Status: ACTIVE
Start: 2025-01-03

## 2025-01-03 ASSESSMENT — INTRAOCULAR PRESSURE
OS: 14
OD: 14

## 2025-01-10 ENCOUNTER — OFFICE VISIT (OUTPATIENT)
Dept: URBAN - METROPOLITAN AREA CLINIC 30 | Facility: CLINIC | Age: 45
End: 2025-01-10
Payer: COMMERCIAL

## 2025-01-10 DIAGNOSIS — M35.01 SICCA SYNDROME WITH KERATOCONJUNCTIVITIS: Primary | ICD-10-CM

## 2025-01-10 DIAGNOSIS — L30.9 DERMATITIS: ICD-10-CM

## 2025-01-10 PROCEDURE — 99214 OFFICE O/P EST MOD 30 MIN: CPT | Performed by: OPTOMETRIST

## 2025-01-10 RX ORDER — PREDNISOLONE ACETATE 10 MG/ML
1 % SUSPENSION/ DROPS OPHTHALMIC
Qty: 5 | Refills: 1 | Status: ACTIVE
Start: 2025-01-10

## 2025-01-10 ASSESSMENT — INTRAOCULAR PRESSURE
OS: 14
OD: 14